# Patient Record
Sex: MALE | Race: WHITE
[De-identification: names, ages, dates, MRNs, and addresses within clinical notes are randomized per-mention and may not be internally consistent; named-entity substitution may affect disease eponyms.]

---

## 2017-12-22 ENCOUNTER — HOSPITAL ENCOUNTER (OUTPATIENT)
Dept: HOSPITAL 53 - M SDC | Age: 67
Discharge: HOME | End: 2017-12-22
Attending: UROLOGY
Payer: MEDICARE

## 2017-12-22 VITALS — SYSTOLIC BLOOD PRESSURE: 170 MMHG | DIASTOLIC BLOOD PRESSURE: 90 MMHG

## 2017-12-22 VITALS — HEIGHT: 73 IN | WEIGHT: 200 LBS | BODY MASS INDEX: 26.51 KG/M2

## 2017-12-22 DIAGNOSIS — E78.00: ICD-10-CM

## 2017-12-22 DIAGNOSIS — G35: ICD-10-CM

## 2017-12-22 DIAGNOSIS — Z96.1: ICD-10-CM

## 2017-12-22 DIAGNOSIS — N21.0: Primary | ICD-10-CM

## 2017-12-22 DIAGNOSIS — E11.9: ICD-10-CM

## 2017-12-22 DIAGNOSIS — Z72.0: ICD-10-CM

## 2017-12-22 DIAGNOSIS — Z79.899: ICD-10-CM

## 2017-12-22 DIAGNOSIS — Z79.01: ICD-10-CM

## 2017-12-22 DIAGNOSIS — R56.9: ICD-10-CM

## 2017-12-22 PROCEDURE — 52317 REMOVE BLADDER STONE: CPT

## 2017-12-22 PROCEDURE — 88300 SURGICAL PATH GROSS: CPT

## 2017-12-22 PROCEDURE — 82360 CALCULUS ASSAY QUANT: CPT

## 2017-12-26 NOTE — RO
DATE OF PROCEDURE:  12/22/2017

 

PREOPERATIVE DIAGNOSIS:  Bladder stones.

 

POSTOPERATIVE DIAGNOSIS:  Bladder stones.

 

OPERATIVE PROCEDURE:  Cystoscopy, cystolitholapaxy.

 

SURGEON:  Emigdio Mcwilliams MD

 

ASSISTANT:  None.

 

ANESTHESIA:  General.

 

OPERATIVE INDICATIONS: This is a 67-year-old male with a neurogenic bladder

managed with a suprapubic catheter. On recent office cystoscopy was found to have

several large bladder stones. He was brought to the operating room today for the

above listed procedure.

 

DESCRIPTION OF OPERATION:  The patient was brought to the operating room where

general anesthesia was induced. Prophylactic antibiotics were infused. He was

then placed in the dorsal lithotomy position and prepped and draped in the usual

sterile fashion. At this point, a nephroscope inserted into the ureteral meatus

and advanced to the bladder. I then used a Cyber wand to fragment all of the

large bladder stones into small pieces and all the small pieces were suctioned

out with the Cyber wand. Once I was certain that all the stone pieces had been

removed, I then withdrew the Cyber wand and the nephroscope out of the bladder.

At this point, I changed out his previously placed #20-Greek suprapubic catheter

with a new one under sterile conditions. The balloon was filled with 10 mL of

sterile water and then the catheter was connected to gravity drainage. This

marked the conclusion of the procedure.

 

The patient was then taken out of the dorsal lithotomy position, awakened from

anesthesia and transported to the recovery room in stable condition.

 

ESTIMATED BLOOD LOSS: Minimal.

 

COMPLICATIONS: None.

 

SPECIMENS: Bladder stone fragments.

 

PLAN: Will have the patient followup in the clinic. We will call the patient to

check on him since he lives far away and then plan to have him followup in one

year per the usual.

## 2021-02-22 ENCOUNTER — HOSPITAL ENCOUNTER (OUTPATIENT)
Dept: HOSPITAL 53 - M RAD | Age: 71
End: 2021-02-22
Attending: PHYSICIAN ASSISTANT
Payer: MEDICARE

## 2021-02-22 DIAGNOSIS — I71.4: Primary | ICD-10-CM

## 2021-02-22 DIAGNOSIS — N20.0: ICD-10-CM

## 2021-02-22 DIAGNOSIS — I70.203: ICD-10-CM

## 2021-02-22 DIAGNOSIS — I70.213: ICD-10-CM

## 2021-02-22 PROCEDURE — 76770 US EXAM ABDO BACK WALL COMP: CPT

## 2021-02-22 PROCEDURE — 93925 LOWER EXTREMITY STUDY: CPT

## 2021-02-22 PROCEDURE — 74174 CTA ABD&PLVS W/CONTRAST: CPT

## 2021-02-22 NOTE — REP
INDICATION:

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



COMPARISON:

None.



TECHNIQUE:

Real-time sonographic evaluation of abdominal aorta performed.



FINDINGS:

The study is limited due to bowel gas and the patient being in a wheelchair.  Proximal

abdominal aorta and very distal abdominal aorta are obscured by overlying bowel gas.

There is dilatation of the mid abdominal aorta measuring approximately 5.5 x 4.8 cm.

Common iliac arteries are slightly ectatic, right 1.2 cm and left 1.1 cm in maximum AP

dimension.



IMPRESSION:

Limited exam.  Only the mid abdominal aorta is visualized and there is an aneurysm at

that location which measures 5.5 x 4.8 cm.





<Electronically signed by Last Acosta > 02/22/21 [id number]

## 2021-02-22 NOTE — REP
INDICATION:

ABD AORTIC ANEURYSM W/O RUPTURE



COMPARISON:

Abdominal aortic ultrasound today



TECHNIQUE:

CT angiogram of the abdomen and pelvis was performed with intravenous administration

of 100 cc of Isovue 370, without oral contrast. 3D MIP and standard reconstruction

images performed.



FINDINGS:

Abdominal aorta: There is an infrarenal abdominal aortic aneurysm with fusiform

configuration.  It has maximum AP diameter 5.3 cm x 5.1 cm transverse with a length of

7.2 cm.  And eccentric CT thrombus is seen anteriorly and towards the left.  There is

no evidence of a dissection.  There is no evidence of retroperitoneal hematoma or

leak.  Atherosclerotic calcifications in the abdominal aorta and and iliac vessels are

noted no iliac aneurysm.  Calcifications at the takeoff of the right and left renal

arteries as well as these celiac axis and SMA.



Lung bases: Basilar fibrotic change some atelectasis and patchy consolidation or at

confluent atelectasis on the right fibrotic changes in the left base.  No effusion.

Liver:  No focal lesion.

Gallbladder:  Unremarkable.

Spleen:  Normal.

Adrenals:  Normal.

Pancreas:  Normal.

Kidneys:  Atherosclerotic calcifications of the renal artery takeoffs and scattered

calcifications of those renal arteries.  There are multiple bilateral renal cysts some

exophytic others cortical.  The largest on the left is a 6.9 cm exophytic cyst

anteriorly with a 4 cm exophytic cyst inferiorly and multiple other 1-2 cm cysts

scattered.  No hydronephrosis or solid mass.  A nonobstructing stone in the renal

pelvis.  Several cysts in the right kidney some exophytic with the largest 3.2 cm

exophytic posteriorly6.  No right renal stones or hydronephrosis on either side.

Small and large bowel: The colon, appendix and small bowel loops grossly unremarkable

there is diverticulosis without diverticulitis.

Free fluid:  None.



Adenopathy:  None.

Appendix: Not inflamed.



Pelvis:  There is a right-sided suprapubic catheter with balloon well inflated.  There

layered the stones in the dependent bladder which is otherwise no distal ureteral

dilatation or stone.  Empty.



Osseous structures:  Some degenerative changes in the spine and pelvis without

destructive lesion or fracture.  Sclerotic focus left iliac wing suggest bone island.



IMPRESSION:

Infrarenal abdominal aortic aneurysm at 5.3 x 5.1 cm with a length of 7.2 cm.  No

dissection or leak/retroperitoneal hematoma.  Diffuse atherosclerotic calcifications

are scattered.



Multiple renal calculi bilaterally without hydronephrosis.  There are a few small

stones in the left renal pelvis without hydronephrosis or hydroureter.  Multiple

layered stones in a collapsed bladder which has a right-sided suprapubic catheter

within.



No other significant or acute finding.





<Electronically signed by Bello Fajardo > 02/22/21 [id number]

## 2021-02-22 NOTE — REP
INDICATION:

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



COMPARISON:

None.



TECHNIQUE:

Real time gray scale and Duplex Doppler evaluation of the bilateral lower extremity

arterial vasculature using linear high frequency transducer.



FINDINGS:

Gray scale and duplex doppler images demonstrate mild to moderate amounts of

atheromatous plaquing with areas of minimal narrowing but no focal stenosis

identified.  Doppler interrogation demonstrates normal arterial wave forms and

velocities bilaterally.  There are diffuse biphasic and triphasic waveforms

bilaterally except for monophasic waveform in the right profunda and in the left

proximal posterior tibial artery.  BRANT right is 0.5 and left 0.6.



Peak systolic velocities (cm/sec)



Common femoral artery:  Right 76; Left 76

Profunda femoris:  Right 62; Left 67

SFA (proximal):  Right 78; Left 52

SFA (mid):  Right 97; Left 93

SFA (distal):  Right 130; Left 107

Popliteal artery:  Right 25; Left 31

LUIS (prox.):  Right 64; Left 91

Tibioperoneal trunk:  Right 46; Left 48

PTA (prox.):  Right 68; Left 32

PTA (distal):  Right 73; Left 65

LUIS (distal):  Right 30; Left 43



IMPRESSION:

Atheromatous changes with areas of narrowing but no obvious focal occlusion or

stenosis.





<Electronically signed by Last Acosta > 02/22/21 [id number]

## 2021-10-13 ENCOUNTER — HOSPITAL ENCOUNTER (OUTPATIENT)
Dept: HOSPITAL 53 - M LABSMTC | Age: 71
End: 2021-10-13
Attending: ANESTHESIOLOGY
Payer: MEDICAID

## 2021-10-13 DIAGNOSIS — Z20.822: ICD-10-CM

## 2021-10-13 DIAGNOSIS — Z01.812: Primary | ICD-10-CM

## 2021-11-15 ENCOUNTER — HOSPITAL ENCOUNTER (OUTPATIENT)
Dept: HOSPITAL 53 - M SDC | Age: 71
Discharge: HOME | End: 2021-11-15
Attending: UROLOGY
Payer: MEDICAID

## 2021-11-15 VITALS — HEIGHT: 73 IN | WEIGHT: 233.3 LBS | BODY MASS INDEX: 30.92 KG/M2

## 2021-11-15 VITALS — DIASTOLIC BLOOD PRESSURE: 74 MMHG | SYSTOLIC BLOOD PRESSURE: 154 MMHG

## 2021-11-15 DIAGNOSIS — F17.218: ICD-10-CM

## 2021-11-15 DIAGNOSIS — F41.9: ICD-10-CM

## 2021-11-15 DIAGNOSIS — G35: ICD-10-CM

## 2021-11-15 DIAGNOSIS — Z79.899: ICD-10-CM

## 2021-11-15 DIAGNOSIS — R32: Primary | ICD-10-CM

## 2021-11-15 DIAGNOSIS — E78.5: ICD-10-CM

## 2021-11-15 DIAGNOSIS — Z79.4: ICD-10-CM

## 2021-11-15 DIAGNOSIS — N31.9: ICD-10-CM

## 2021-11-15 DIAGNOSIS — Z79.01: ICD-10-CM

## 2021-11-15 DIAGNOSIS — F32.9: ICD-10-CM

## 2021-11-15 DIAGNOSIS — N21.0: ICD-10-CM

## 2021-11-15 DIAGNOSIS — E11.9: ICD-10-CM

## 2021-11-15 PROCEDURE — 51705 CHANGE OF BLADDER TUBE: CPT

## 2021-11-15 PROCEDURE — 88300 SURGICAL PATH GROSS: CPT

## 2021-11-15 PROCEDURE — 52287 CYSTOSCOPY CHEMODENERVATION: CPT

## 2021-11-15 PROCEDURE — 52317 REMOVE BLADDER STONE: CPT

## 2021-11-15 PROCEDURE — 82365 CALCULUS SPECTROSCOPY: CPT

## 2021-11-15 NOTE — ROOPDOC
[facility] Report Of Operation


Report of Operation


DATE OF PROCEDURE: 11/15/21





PREPROCEDURE DIAGNOSES: [Multiple sclerosis, neurogenic bladder, suprapubic 

tube, incontinence around suprapubic tube caused by bladder spasms despite 

medication].





POSTPROCEDURE DIAGNOSES: [Same and many bladder stones].





PROCEDURE PERFORMED: [cystoscopy, cystolitholapaxy with laser, Botox injections 

100 units and sp tube change 20fr with 10cc in balloon]. 





SURGEON: [DAVIDSON Teran], MD





ASSISTANT: [None], MD





ANESTHESIA: [MAC].





ESTIMATED BLOOD LOSS: Approximately [minimal] mL. 





COMPLICATIONS: [None]. 





REMARKS: [71-year-old white male.  Above-mentioned preoperative diagnoses.  

After discussing options, patient decided on a trial of Botox.  The urinary 

incontinence is causing a diminished quality of life.  Patient is on oxybutynin 

currently and still is incontinent around the suprapubic tube.  Informed consent

 obtained.  Risks discussed such as infection, bleeding, pain, scarring, injury 

to the urinary tract, persistent incontinence, risks of anesthesia and others.  

No guarantees given.].





FINDINGS: 





SPECIMENS REMOVED: [None]





PROCEDURE NOTE: . 





DESCRIPTION OF PROCEDURE: [I met with patient in the preop area and again 

discussed surgery.  Patient wished to proceed.  Patient brought to operating 

room.  A Lindsay lift was used.  Positioning was dorsolithotomy.  Well-padded.  

Prepped and draped in usual sterile fashion.  Surgery done under coverage of an 

IV antibiotic.  Timeout performed.  Suprapubic tube was removed before prepping 

and draping.  An 18 Colombian suprapubic tube was in place.  For some reason there 

were 20 to 30 cc in the balloon.  A new suprapubic tube was placed.  20 Colombian. 

 10 cc in the balloon.  Rigid cystoscopy was performed.  Many bladder stones 

were noted, probably 10 or so.  I decided to laser the stones so as to remove 

them.  Cystolitholapaxy was performed using the laser.  Fortunately the stones 

fragmented well.  The fragments were removed from the bladder via irrigation.  O

nce the bladder was cleared of bladder stones Botox was injected.  100 units 

were given in the form of 10 injections, 1 cc each, 10 units/cc.  No difficulty 

was encountered.  The new suprapubic tube was noted to be in proper position.  

Patient tolerated everything well left the room in satisfactory condition.





Home today on antibiotic.  I discussed everything with patient and wife.].











APARNA TERAN MD            Nov 15, 2021 10:14

## 2021-11-18 LAB
AMM URATE MFR STONE: (no result) %
CA H2 PHOS DIHYD MFR STONE: (no result) %
CALCIUM OXALATE DIHYDRATE MFR STONE IR: (no result) %
CELL MATERIAL STONE EST-MCNT: (no result) %
CHOLEST SERPL-MCNC: (no result) MG/DL
COM MFR STONE: 10 %
NA URATE MFR STONE IR: (no result) %
STONE ANALYSIS-IMP: (no result)
URATE DIHYD MFR STONE: (no result) %
URATE SERPL-MCNC: (no result) MG/DL